# Patient Record
Sex: FEMALE | Race: WHITE | NOT HISPANIC OR LATINO | URBAN - METROPOLITAN AREA
[De-identification: names, ages, dates, MRNs, and addresses within clinical notes are randomized per-mention and may not be internally consistent; named-entity substitution may affect disease eponyms.]

---

## 2018-11-20 ENCOUNTER — EMERGENCY (EMERGENCY)
Facility: HOSPITAL | Age: 48
LOS: 1 days | Discharge: HOME | End: 2018-11-20
Attending: EMERGENCY MEDICINE
Payer: COMMERCIAL

## 2018-11-20 VITALS
SYSTOLIC BLOOD PRESSURE: 118 MMHG | OXYGEN SATURATION: 97 % | RESPIRATION RATE: 17 BRPM | DIASTOLIC BLOOD PRESSURE: 68 MMHG | HEART RATE: 90 BPM

## 2018-11-20 VITALS — WEIGHT: 136.91 LBS | HEIGHT: 61 IN

## 2018-11-20 PROCEDURE — 73030 X-RAY EXAM OF SHOULDER: CPT

## 2018-11-20 PROCEDURE — 73590 X-RAY EXAM OF LOWER LEG: CPT | Mod: 26,RT

## 2018-11-20 PROCEDURE — 99284 EMERGENCY DEPT VISIT MOD MDM: CPT | Mod: 25

## 2018-11-20 PROCEDURE — 73502 X-RAY EXAM HIP UNI 2-3 VIEWS: CPT | Mod: 26,RT

## 2018-11-20 PROCEDURE — 73590 X-RAY EXAM OF LOWER LEG: CPT

## 2018-11-20 PROCEDURE — 73030 X-RAY EXAM OF SHOULDER: CPT | Mod: 26,RT

## 2018-11-20 PROCEDURE — 73502 X-RAY EXAM HIP UNI 2-3 VIEWS: CPT

## 2018-11-20 PROCEDURE — 96372 THER/PROPH/DIAG INJ SC/IM: CPT

## 2018-11-20 PROCEDURE — 99283 EMERGENCY DEPT VISIT LOW MDM: CPT

## 2018-11-20 RX ORDER — KETOROLAC TROMETHAMINE 30 MG/ML
30 SYRINGE (ML) INJECTION ONCE
Qty: 0 | Refills: 0 | Status: DISCONTINUED | OUTPATIENT
Start: 2018-11-20 | End: 2018-11-20

## 2018-11-20 RX ORDER — POLYETHYLENE GLYCOL 3350 17 G/17G
17 POWDER, FOR SOLUTION ORAL
Qty: 85 | Refills: 0 | OUTPATIENT
Start: 2018-11-20 | End: 2018-11-24

## 2018-11-20 RX ADMIN — Medication 30 MILLIGRAM(S): at 20:59

## 2018-11-20 NOTE — ED ADULT TRIAGE NOTE - CHIEF COMPLAINT QUOTE
fell yesterday and rt side of body, knee, arm. hand, elbow, shoulder, neck. gets around with walker.  abd pain with earlier blood in stool also.

## 2018-11-20 NOTE — ED PROVIDER NOTE - OBJECTIVE STATEMENT
49 yo female smoker, PMHx AVN left knee, fibromyalgia, osteoarthritis c/o fall x3 days ago in bathtub. Reports pain in shoulder/hip/leg. Leg pain is worse at 8/10. Reports able to ambulate with limp. Pt pain is managed by Dr. Fan De Santiago. Reports constipation from her medications. PCP: Dr. Aminata Boone.  Denies fever/chills, dizziness, chest pain, SOB, new onset bowel/bladder incontinence.

## 2018-11-20 NOTE — ED PROVIDER NOTE - ATTENDING CONTRIBUTION TO CARE
Pt. with swelling and tenderness to dorsum of right lower leg. Mild ecchymosis noted to lower leg. Sensation to skin intact. I have discussed the plan with the ACP.

## 2018-11-20 NOTE — ED PROVIDER NOTE - NS HIV RISK FACTOR YES
DATE OF ADMISSION:  08/17/2018

 

CHIEF COMPLAINT:  Left ankle injury.

 

HISTORY OF PRESENT ILLNESS:  The patient is a 52-year-old female well known

to me from previous right knee replacement done back in December.  She is

doing relatively well from this.  She was out on her porch last evening when

she fell off the porch, injured her left ankle.  She had acute onset of pain.

 She was seen in the Emergency Room.  X-rays revealed an ankle fracture.  She

was referred to our clinic for treatment.  She describes global ankle pain. 

She can put weight on it.  She has been in the splint since in the ER.

 

Of note, the patient does have a thrombocytopenia with low platelet levels

thought to be related to a hepatic steatosis and splenomegaly.  She was seen

before her knee replacement surgery by the oncologist and felt there was no

intervention warranted.  Otherwise, she has done well.  She did skin up her

knee.  There has been no head injury.

 

PAST MEDICAL HISTORY:

1.  Hypertension.

2.  Sleep apnea with CPAP machine.

3.  Fibromyalgia.

4.  Depression.

5.  Anxiety.

6.  Chronic back pain.

7.  Obesity with a BMI of 44.

8.  Hypothyroidism.

9.  Unspecified neuropathy.

10.  Thrombocytopenia.

 

PAST SURGICAL HISTORY:  Include:

1.  Dorsal spinal column stimulator.

2.  Multiple C-sections.

3.  Right knee arthroscopy.

4.  Right knee replacement done 12/19/2017.

 

ALLERGIES:  PENICILLIN WHICH CAUSE HIVES ONLY.  No respiratory problems. 

ALSO, DESCRIBES ALLERGY TO SULFA AND CIPRO.

 

CURRENT MEDICINES:  Include:

1.  Buspirone 30 mg twice a day.

2.  Trazodone 50 mg at bedtime.

3.  Wellbutrin 150 mg twice.

4.  Lisinopril 20 mg a day.

5.  Synthroid 

6.  Fluticasone nasal spray.

7.  Clonazepam 3 times a day for anxiety.

8.  Advair 2 puffs a day.

9.  Lyrica 200 mg twice a day.

10.  Prozac 20 mg.

11.  Methocarbamol 500 mg 4 times a day.

 

SOCIAL HISTORY:  A 52-year-old female.  She is .  Comes to clinic

with her son who lives in [place].

 

FAMILY HISTORY:  Noncontributory.

 

REVIEW OF SYSTEMS:  Significant for the thrombocytopenia.  Denies any chest

pain, no shortness of breath.  Does not notice any excessive bleeding or any

other issues.  No head injury, no loss of consciousness.

 

PHYSICAL EXAMINATION:

GENERAL:  Reveals a pleasant, middle-aged female.  Looks to be in reasonably

good health.

HEENT:  Benign.

NECK:  Supple, no lymphadenopathy.

LUNGS:  Clear to auscultation.

HEART:  Has a regular rate and rhythm.

ABDOMEN:  Soft, nontender, nondistended.

EXTREMITIES:  Grossly neurovascularly intact except as follows:  Examination

of the left ankle reveals no obvious deformity.  She does have some abrasions

up around her knee and her upper leg area which looked pretty clean.  She is

tender both medially and laterally.  She is neurologically intact.  Some

moderate swelling.

 

X-RAYS:  X-rays of the left ankle reviewed.  She has an unstable Gomez B

ankle fracture with a deltoid ligament injury.  There is some slight

comminution of the fibula.

 

ASSESSMENT:  A 52-year-old female with an unstable Gomez B ankle fracture. 

This is certainly best treated surgically.

 

PLAN:  We talked about treatment options.  She would like to proceed with

surgical treatment.  We will take her to the operating room and do a left

ankle ORIF.  The risks and benefits of this procedure were explained to the

patient including but not limited to DVT,  PE, death, infection, neurological

injury, vascular injury, bleeding problem, pain, limited range of motion,

stiffness, failure to relieve her symptoms, incomplete relief of symptoms,

nonunion, malunion, symptomatic hardware, etc.  The patient understands

and desires to proceed.  Informed consent was obtained.

 

In respect to platelet issue, I do not think it should pose any risk as got

through her knee replacement well.  We will hold off on NSAID products as a

result.  This should be able to be done as an outpatient and we should be

able to send her home as long as everything goes well.  We will keep her

nonweightbearing for the first 2 weeks.

 

 

 

POLA Declined

## 2018-11-20 NOTE — ED ADULT NURSE NOTE - OBJECTIVE STATEMENT
received pt AOx4 in supertrack, fell yesterday and rt side of body, knee, arm. hand, elbow, shoulder, neck. walks with walker,. resp even unlabored, in no distress, MAEx4, neuro intact. will continue to monitor

## 2018-11-20 NOTE — ED PROVIDER NOTE - MEDICAL DECISION MAKING DETAILS
49 yo female presents s/p fall x3 day c/o of pain to shoulder/hip/leg. Also c/o constipation. No acute fx on XR. MiraLAX prescribed. If pain does not subside within 1 week, follow up with PCP for further evaluation. Pt is comfortable with discharge; understands and agrees with plan. 49 yo female presents s/p fall x3 day c/o of pain to shoulder/hip/leg. Also c/o constipation. No acute fx on XR; pt requests crutches to help ambulate. MiraLAX prescribed. If pain does not subside within 1 week, follow up with PCP for further evaluation. Pt is comfortable with discharge; understands and agrees with plan.

## 2018-11-20 NOTE — ED STATDOCS - OBJECTIVE STATEMENT
Telemedicine assessment was conducted (using real time 2 way audio-video technology) by Dr. Tristan Luna located at [address] [place] [address]  ++++++++++++++++++++++++  Pertinent patient history and initial plan: 47 y/o F pt with hx of AVN in L knee, fibromyalgia, osteoarthritis and carpal tunnel presents to ED c/o R UE, R shoulder, R hip, R LE pain s/p fall that occurred yesterday. Pt states she fell in the bathtub yesterday and notes a "dent" to R shin. PT take multiple pain medications from pain management, with no relief. Pt reports constipation, she felt "balls" underneath her skin and states she is unable to void completely. She took OTC stool softener with no relief.   PMD: Dr. Aminata Boone Telemedicine assessment was conducted (using real time 2 way audio-video technology) by Dr. Tristan Luna located at [address] [place] [address]  ++++++++++++++++++++++++  Pertinent patient history and initial plan: 47 y/o F pt with hx of AVN in L knee, fibromyalgia, osteoarthritis and carpal tunnel presents to ED c/o R UE, R shoulder, R hip, R LE pain s/p fall that occurred yesterday. Pt states she fell in the bathtub yesterday and notes a "dent" to R shin. PT take multiple pain medications from pain management, with no relief. Pt reports constipation, she felt "balls" underneath her skin and states she is unable to defecate completely. She took OTC stool softener with no relief.   PMD: Dr. Aminata schmidt xray shoulder, hip and leg  patient ambulatory    discussed otc meds for constipation    Patient seen by me in intake for initial assessment and ordering. Physician on site to follow results and further evaluate and treat patient.

## 2018-11-20 NOTE — ED PROVIDER NOTE - PHYSICAL EXAMINATION
Gen: A&Ox3. WN/WD in NAD, cooperative, well-groomed, appears stated age, seated comfortably on chair.  Head: NC/AT.   Neck: No spinal tenderness.  Cardio: No lifts, heaves, visible pulsations. No thrills. Rate and rhythm regular, S1 & S2 clear. No audible murmur, gallop, or rub.  Pulm: No deformity. Normal AP to lateral diameter. Symmetrical excursion b/l. No chest wall tenderness. Breath sounds vesicular, symmetrical and without rales, rhonchi or wheezing b/l.   MSK: No deformities, wasting, atrophy, crepitus. Small ecchymosis noted to right dorsal forearm. Large hematoma noted to right anterior calf; tender to palpation. Normal color and temperature. Full ROM without tenderness in hands, wrists, elbows, shoulders, spine, hips, knees, ankles.  Strength 5+/5+ b/l upper/lower extremities.   Neuro: Sensation intact. Gen: A&Ox3. WN/WD in NAD, cooperative, well-groomed, appears stated age, seated comfortably on chair.  Head: NC/AT.   Neck: No spinal tenderness.  Cardio: No lifts, heaves, visible pulsations. No thrills. Rate and rhythm regular, S1 & S2 clear. No audible murmur, gallop, or rub.  Pulm: No deformity. Normal AP to lateral diameter. Symmetrical excursion b/l. No chest wall tenderness. Breath sounds vesicular, symmetrical and without rales, rhonchi or wheezing b/l.   MSK: No deformities, wasting, atrophy, crepitus. Small ecchymosis noted to right dorsal forearm. Large hematoma noted to right anterior calf; tender to palpation. Compartments soft b/l. Normal color and temperature. Full ROM without tenderness in hands, wrists, elbows, shoulders, spine, hips, knees, ankles.  Strength 5+/5+ b/l upper/lower extremities.   Neuro: Sensation intact. Gen: A&Ox3. WN/WD in NAD, cooperative, well-groomed, appears stated age, seated comfortably on chair.  Head: NC/AT.   Neck: No spinal tenderness.  Cardio: No lifts, heaves, visible pulsations. No thrills. Rate and rhythm regular, S1 & S2 clear. No audible murmur, gallop, or rub.  Pulm: No deformity. Normal AP to lateral diameter. Symmetrical excursion b/l. No chest wall tenderness. Breath sounds vesicular, symmetrical and without rales, rhonchi or wheezing b/l.   MSK: No deformities, wasting, atrophy, crepitus. Small ecchymosis noted to right dorsal forearm. Minimal swelling, hematoma noted to right anterior calf; tender to palpation. Compartments soft b/l. Normal color and temperature. Full ROM without tenderness in hands, wrists, elbows, shoulders, spine, hips, knees, ankles.  Strength 5+/5+ b/l upper/lower extremities.   Neuro: Sensation intact.

## 2018-11-20 NOTE — ED ADULT NURSE NOTE - NSIMPLEMENTINTERV_GEN_ALL_ED
Implemented All Universal Safety Interventions:  [place] to call system. Call bell, personal items and telephone within reach. Instruct patient to call for assistance. Room bathroom lighting operational. Non-slip footwear when patient is off stretcher. Physically safe environment: no spills, clutter or unnecessary equipment. Stretcher in lowest position, wheels locked, appropriate side rails in place.

## 2019-02-19 ENCOUNTER — INPATIENT (INPATIENT)
Facility: HOSPITAL | Age: 49
LOS: 8 days | Discharge: HOME | DRG: 885 | End: 2019-02-28
Attending: PSYCHIATRY & NEUROLOGY | Admitting: PSYCHIATRY & NEUROLOGY
Payer: COMMERCIAL

## 2019-02-19 DIAGNOSIS — F33.2 MAJOR DEPRESSIVE DISORDER, RECURRENT SEVERE WITHOUT PSYCHOTIC FEATURES: ICD-10-CM

## 2019-02-19 DIAGNOSIS — T43.591A POISONING BY OTHER ANTIPSYCHOTICS AND NEUROLEPTICS, ACCIDENTAL (UNINTENTIONAL), INITIAL ENCOUNTER: ICD-10-CM

## 2019-02-19 PROCEDURE — 90792 PSYCH DIAG EVAL W/MED SRVCS: CPT

## 2019-02-19 PROCEDURE — 12345: CPT | Mod: NC

## 2019-02-19 RX ORDER — AMLODIPINE BESYLATE 2.5 MG/1
2.5 TABLET ORAL DAILY
Qty: 0 | Refills: 0 | Status: DISCONTINUED | OUTPATIENT
Start: 2019-02-19 | End: 2019-02-28

## 2019-02-19 RX ORDER — PANTOPRAZOLE SODIUM 20 MG/1
40 TABLET, DELAYED RELEASE ORAL
Qty: 0 | Refills: 0 | Status: DISCONTINUED | OUTPATIENT
Start: 2019-02-19 | End: 2019-02-28

## 2019-02-19 RX ORDER — NICOTINE POLACRILEX 2 MG
1 GUM BUCCAL DAILY
Qty: 0 | Refills: 0 | Status: DISCONTINUED | OUTPATIENT
Start: 2019-02-19 | End: 2019-02-21

## 2019-02-19 RX ORDER — CELECOXIB 200 MG/1
200 CAPSULE ORAL DAILY
Qty: 0 | Refills: 0 | Status: DISCONTINUED | OUTPATIENT
Start: 2019-02-19 | End: 2019-02-28

## 2019-02-19 RX ORDER — HALOPERIDOL DECANOATE 100 MG/ML
5 INJECTION INTRAMUSCULAR EVERY 6 HOURS
Qty: 0 | Refills: 0 | Status: DISCONTINUED | OUTPATIENT
Start: 2019-02-19 | End: 2019-02-28

## 2019-02-19 RX ORDER — ZOLPIDEM TARTRATE 10 MG/1
5 TABLET ORAL AT BEDTIME
Qty: 0 | Refills: 0 | Status: DISCONTINUED | OUTPATIENT
Start: 2019-02-19 | End: 2019-02-25

## 2019-02-19 RX ORDER — POLYETHYLENE GLYCOL 3350 17 G/17G
17 POWDER, FOR SOLUTION ORAL DAILY
Qty: 0 | Refills: 0 | Status: DISCONTINUED | OUTPATIENT
Start: 2019-02-19 | End: 2019-02-28

## 2019-02-19 RX ORDER — ATORVASTATIN CALCIUM 80 MG/1
20 TABLET, FILM COATED ORAL AT BEDTIME
Qty: 0 | Refills: 0 | Status: DISCONTINUED | OUTPATIENT
Start: 2019-02-19 | End: 2019-02-28

## 2019-02-19 RX ORDER — DIPHENHYDRAMINE HCL 50 MG
50 CAPSULE ORAL EVERY 6 HOURS
Qty: 0 | Refills: 0 | Status: DISCONTINUED | OUTPATIENT
Start: 2019-02-19 | End: 2019-02-28

## 2019-02-19 RX ADMIN — ZOLPIDEM TARTRATE 5 MILLIGRAM(S): 10 TABLET ORAL at 20:38

## 2019-02-19 RX ADMIN — ATORVASTATIN CALCIUM 20 MILLIGRAM(S): 80 TABLET, FILM COATED ORAL at 20:38

## 2019-02-19 RX ADMIN — ZOLPIDEM TARTRATE 5 MILLIGRAM(S): 10 TABLET ORAL at 21:44

## 2019-02-19 NOTE — BEHAVIORAL HEALTH ASSESSMENT NOTE - HPI (INCLUDE ILLNESS QUALITY, SEVERITY, DURATION, TIMING, CONTEXT, MODIFYING FACTORS, ASSOCIATED SIGNS AND SYMPTOMS)
Patient is a 47 y/o MF, with prior psychaitric hospitalizations, with history of depression, admitted to the hospital via transfer from [place]. Patient reportedly presented on 2/15/2019 with s/p overdose with Tizanidine (12 pills x 2mg), Hydroxyzine (15x25). She reportedly left rmessages on families' voicemails stating that she is planning to commit suicide. She was admitted to medicine for elevated for LFT and then was evaluated by psychiatry and determined that she needed a psychiatric admission. Patient presents as evasive and minimizes the event. Of note the anniversary of her son's death via overdose is approaching (March 2018) Patient is a 49 y/o MF, with prior psychaitric hospitalizations, with history of depression, admitted to the hospital via transfer from [place]. Patient reportedly presented on 2/15/2019 with s/p overdose with Tizanidine (12 pills x 2mg), Hydroxyzine (15x25). She reportedly left messages on families' voicemails stating that she is planning to commit suicide. She was admitted to medicine for elevated for LFT and then was evaluated by psychiatry and determined that she needed a psychiatric admission. Patient presents as evasive and minimizes the event. Of note the anniversary of her son's death via overdose is approaching (2018).    Patient is depressed, expressed SI, and continues to lament her son's loss a 27 y/o male who  of heroin overdose. She has multiple medical issues, and endorses pain  which seems withdrawal effect from different pain meds. She never tried to commit suicide, never tried other drugs, smokes cigarettes 2-3 PPD, , socially drinks wine/liquor, and has fair to good sleep from the meds. She denied A/H or other perceptual experiences, denied paranoid beliefs, no manic or hypo-manic episodes noted.    Her issues started since her accident involving her left Knee, which needed arthroscopic surgery, and was given pain meds, and since then she has multiple medical issues, which involves, Osteo-arthritis; Fibromyalgia; Avascular Necrosis of the left Hip; CAD; Migraine etc. and take different pain meds for years.

## 2019-02-19 NOTE — CHART NOTE - NSCHARTNOTEFT_GEN_A_CORE
Called by RN for patient requesting opioid pain meds, seen with the aid Mac MORAES X3, calm, stating that she is on Morphine & Oxycodone, last dose was at 2:30 pm today at [facility], and she is afraid that she may get into withdrawal. O/E: no evidence suggestive of opioid withdrawal.   Received a phone call from her psychiatrist here Dr. Wilson, who recommended putting orders on his behalf for Haldol 5 mg PO Q 6h PRN for agitation, Ativan 2 mg PO Q 6h PRN for agitation, and Benadryl 50 mg PO Q 6h PRN for agitation, no opioids at this time, and he will evaluate in am.   Orders were placed as recommended, explained to patient, she understands & agrees.

## 2019-02-19 NOTE — BEHAVIORAL HEALTH ASSESSMENT NOTE - SUMMARY
47 y/o MF, with no prior psychiatric hospitalizations, disabled, history of depression, admitted for overdose in context of worsened mood likely due to anniversary of her son's death. 49 y/o MF, with no prior psychiatric hospitalizations, disabled, history of depression, admitted for overdose in context of worsened mood likely due to anniversary of her son's death. Continues to lament her son' s loss and need treatment at in-patient level for stability.

## 2019-02-19 NOTE — BEHAVIORAL HEALTH ASSESSMENT NOTE - NSBHADMITIPSTRENGTH_PSY_A_CORE
Has supportive interpersonal relationships with family, friends or peers/Able to manage surrounding demands/opportunities

## 2019-02-20 PROCEDURE — 99223 1ST HOSP IP/OBS HIGH 75: CPT

## 2019-02-20 RX ORDER — MORPHINE SULFATE 50 MG/1
15 CAPSULE, EXTENDED RELEASE ORAL
Qty: 0 | Refills: 0 | Status: DISCONTINUED | OUTPATIENT
Start: 2019-02-20 | End: 2019-02-27

## 2019-02-20 RX ORDER — ZOLPIDEM TARTRATE 10 MG/1
5 TABLET ORAL AT BEDTIME
Qty: 0 | Refills: 0 | Status: DISCONTINUED | OUTPATIENT
Start: 2019-02-20 | End: 2019-02-25

## 2019-02-20 RX ORDER — OXYCODONE HYDROCHLORIDE 5 MG/1
10 TABLET ORAL
Qty: 0 | Refills: 0 | Status: DISCONTINUED | OUTPATIENT
Start: 2019-02-20 | End: 2019-02-22

## 2019-02-20 RX ORDER — NITROGLYCERIN 6.5 MG
1 CAPSULE, EXTENDED RELEASE ORAL DAILY
Qty: 0 | Refills: 0 | Status: DISCONTINUED | OUTPATIENT
Start: 2019-02-20 | End: 2019-02-28

## 2019-02-20 RX ADMIN — ATORVASTATIN CALCIUM 20 MILLIGRAM(S): 80 TABLET, FILM COATED ORAL at 20:38

## 2019-02-20 RX ADMIN — ZOLPIDEM TARTRATE 5 MILLIGRAM(S): 10 TABLET ORAL at 21:47

## 2019-02-20 RX ADMIN — OXYCODONE HYDROCHLORIDE 10 MILLIGRAM(S): 5 TABLET ORAL at 13:00

## 2019-02-20 RX ADMIN — CELECOXIB 200 MILLIGRAM(S): 200 CAPSULE ORAL at 09:08

## 2019-02-20 RX ADMIN — CELECOXIB 200 MILLIGRAM(S): 200 CAPSULE ORAL at 10:00

## 2019-02-20 RX ADMIN — MORPHINE SULFATE 15 MILLIGRAM(S): 50 CAPSULE, EXTENDED RELEASE ORAL at 20:38

## 2019-02-20 RX ADMIN — Medication 1 PATCH: at 09:08

## 2019-02-20 RX ADMIN — AMLODIPINE BESYLATE 2.5 MILLIGRAM(S): 2.5 TABLET ORAL at 09:08

## 2019-02-20 RX ADMIN — ZOLPIDEM TARTRATE 5 MILLIGRAM(S): 10 TABLET ORAL at 20:43

## 2019-02-20 RX ADMIN — Medication 100 MILLIGRAM(S): at 20:38

## 2019-02-20 RX ADMIN — Medication 2 MILLIGRAM(S): at 09:08

## 2019-02-20 RX ADMIN — PANTOPRAZOLE SODIUM 40 MILLIGRAM(S): 20 TABLET, DELAYED RELEASE ORAL at 08:09

## 2019-02-20 RX ADMIN — Medication 1 PATCH: at 22:55

## 2019-02-20 RX ADMIN — OXYCODONE HYDROCHLORIDE 10 MILLIGRAM(S): 5 TABLET ORAL at 18:37

## 2019-02-20 RX ADMIN — OXYCODONE HYDROCHLORIDE 10 MILLIGRAM(S): 5 TABLET ORAL at 19:37

## 2019-02-20 RX ADMIN — OXYCODONE HYDROCHLORIDE 10 MILLIGRAM(S): 5 TABLET ORAL at 12:13

## 2019-02-20 RX ADMIN — MORPHINE SULFATE 15 MILLIGRAM(S): 50 CAPSULE, EXTENDED RELEASE ORAL at 21:46

## 2019-02-20 NOTE — CHART NOTE - NSCHARTNOTEFT_GEN_A_CORE
ISTOP checked Reference #: [id number]     Patient Name: Kia Albrecht YOB: 1970   Address: [address] [place] [address] Sex: Female     02/15/2019 02/16/2019 oxycodone hcl 10 mg tablet  120 30 Mati Marinelli,M     02/15/2019 02/16/2019 morphine sulf er 15 mg tablet  60 30 Mati Marinelli,M     01/15/2019 02/12/2019 lyrica 100 mg capsule  60 30 Mati Marinelli,M     01/15/2019 01/15/2019 oxycodone hcl 10 mg tablet  120 30 Mati Marinelli,M     01/15/2019 01/15/2019 morphine sulf er 15 mg tablet  60 30 Mati Marinelli,M     01/15/2019 01/15/2019 zolpidem tartrate 5 mg tablet  30 30 Mati Marinelli,M     12/17/2018 01/13/2019 lyrica 100 mg capsule  60 30 Mati Marinelli,M     12/19/2018 12/19/2018 alprazolam 0.5 mg tablet  20 10 Dia Montalvo (PA)     12/19/2018 12/19/2018 promethazine-codeine syrup  50ml 10 Dia Montalvo ([place])     09/17/2018 12/17/2018 lyrica 100 mg capsule  60 30 Mati Marinelli,M     12/17/2018 12/17/2018 oxycodone hcl 10 mg tablet  120 30 Mati Marinelli,M     12/17/2018 12/17/2018 morphine sulf er 15 mg tablet  60 30 Mati Marinelli,M     12/17/2018 12/17/2018 zolpidem tartrate 10 mg tablet  30 30 Mati Marinelli,M    11/05/2018 11/16/2018 lyrica 100 mg capsule  60 30 Mati Marinelli,M     11/05/2018 11/16/2018 oxycodone hcl 10 mg tablet  120 30 Mati Marinelli,M     11/05/2018 11/16/2018 zolpidem tartrate 10 mg tablet  30 30 Mati Marinelli,M     11/13/2018 11/13/2018 alprazolam 0.5 mg tablet  20 10 Dia Montalvo ([place])     11/05/2018 11/12/2018 morphine sulf er 15 mg tablet  60 30 Mati Marinelli,M     08/21/2018 09/22/2018 lyrica 100 mg capsule  60 30 Mati Marinelli,M     09/20/2018 09/20/2018 alprazolam 0.5 mg tablet  20 10 Isabel Cook (FNP)     10/10/2018 10/17/2018 lyrica 100 mg capsule  60 30 Mati Marinelli,M     10/10/2018 10/17/2018 zolpidem tartrate 10 mg tablet  30 30 Mati Marinelli,M     10/10/2018 10/17/2018 oxycodone hcl 10 mg tablet  120 30 Mati Marinelli,ANISHA     10/10/2018 10/11/2018 morphine sulf er 15 mg tablet  60 30 Mati Marinelli,M     09/17/2018 09/18/2018 morphine sulf er 15 mg tablet  30 30 Mati Marinelli,M     09/17/2018 09/18/2018 zolpidem tartrate 10 mg tablet  30 30 Mati Marinelli,M     07/24/2018 08/22/2018 lyrica 100 mg capsule  60 30 Mati Marinelli,ANISHA     08/21/2018 08/22/2018 oxycodone hcl 10 mg tablet  120 30 Mati Marinelli,ANISHA     08/21/2018 08/22/2018 morphine sulf er 15 mg tablet  30 30 Mati Marinelli,     08/21/2018 08/22/2018 zolpidem tartrate 10 mg tablet  30 30 Mati Marinelli,M     07/31/2018 07/31/2018 alprazolam 0.5 mg tablet  20 10 Dia Montalvo ([place])     07/31/2018 07/31/2018 promethazine-codeine syrup  50ml 10 Dia Montalvo ([place])     07/24/2018 07/24/2018 oxycodone hcl 10 mg tablet  120 30 Mati Marinelli,M     07/24/2018 07/24/2018 zolpidem tartrate 10 mg tablet  30 30 Elzholz, Mati,M     07/24/2018 07/24/2018 morphine sulf er 15 mg tablet  30 30 Elzholz, Mati,M     06/27/2018 07/23/2018 lyrica 100 mg capsule  60 30 Elzholz, Mati,ANISHA     06/27/2018 06/27/2018 oxycodone hcl 10 mg tablet  120 30 Elzholz, Mati     06/27/2018 06/27/2018 morphine sulf er 15 mg tablet  30 30 Elzholz, Mati     06/27/2018 06/27/2018 zolpidem tartrate 10 mg tablet  30 30 Elzholz, Mati     06/25/2018 06/25/2018 alprazolam 0.5 mg tablet  20 10 Dia Montalvo ([place])     05/23/2018 06/21/2018 lyrica 100 mg capsule  60 30 Elzholz, Mati     05/23/2018 05/27/2018 oxycodone hcl 10 mg tablet  120 30 Elzholz, Mati     05/23/2018 05/24/2018 zolpidem tartrate 10 mg tablet  30 30 Elzholz, Mati     05/23/2018 05/23/2018 morphine sulf er 15 mg tablet  30 30 Elzholz, Mati     04/26/2018 05/22/2018 lyrica 100 mg capsule  60 30 Elzholz, Mati     05/16/2018 05/16/2018 promethazine-codeine syrup  50ml 10 Dia Montalvo ([place])     05/16/2018 05/16/2018 alprazolam 0.5 mg tablet  20 10 Dia Montalvo ([place])     04/25/2018 05/09/2018 morphine sulf er 15 mg tablet  45 12 Elzholz, Mati     04/25/2018 04/30/2018 oxycodone hcl 10 mg tablet  120 30 Elzholz, Mati     04/25/2018 04/25/2018 lyrica 100 mg capsule  60 30 Elzholz, Mati     04/25/2018 04/25/2018 zolpidem tartrate 10 mg tablet  30 30 Elzholz, Mati     04/24/2018 04/24/2018 alprazolam 0.5 mg tablet  20 10 Joyce Isabel DAGMAR (FNP)     04/11/2018 04/11/2018 oxycodone hcl 10 mg tablet  120 20 Elzholz, [place]     04/11/2018 04/11/2018 morphine sulf er 15 mg tablet  60 30 Elzholz, [place]     04/04/2018 04/04/2018 promethazine-codeine syrup  50ml 10 Dia Montalvo ([place])     04/03/2018 04/03/2018 alprazolam 0.5 mg tablet  20 10 Dia Montalvo ([place])     03/17/2018 03/27/2018 lyrica 100 mg capsule  60 30 Elzholz, Mati     03/17/2018 03/19/2018 zolpidem tartrate 10 mg tablet  30 30 Elzholz, [place]     03/08/2018 03/13/2018 oxycodone hcl 10 mg tablet  120 30 Elzholz, Mati     03/08/2018 03/13/2018 morphine sulf er 15 mg tablet  60 30 Elzholz, [place]     03/05/2018 03/05/2018 alprazolam 0.5 mg tablet  20 10 Dia Montalvo (PA) ISTOP checked Reference #: [id number]     Patient Name: Kia Albrecht YOB: 1970   Address: [address] [place] [address] Sex: Female     Dr Mati Marinelli is a Pain management specialist at [facility]  located at  [address] [place] [address] [address], [place] [address]. Writer called his office at  [phone], option #4 and left him a message asking for a call back.     02/15/2019 02/16/2019 oxycodone hcl 10 mg tablet  120 30 Rejidede Mati,M     02/15/2019 02/16/2019 morphine sulf er 15 mg tablet  60 30 Rejidede Mati,M     01/15/2019 02/12/2019 lyrica 100 mg capsule  60 30 Rejidede Mati,M     01/15/2019 01/15/2019 oxycodone hcl 10 mg tablet  120 30 Rejidede Mati,M     01/15/2019 01/15/2019 morphine sulf er 15 mg tablet  60 30 Rejidede Mati,M     01/15/2019 01/15/2019 zolpidem tartrate 5 mg tablet  30 30 RejidedeMati,M     12/17/2018 01/13/2019 lyrica 100 mg capsule  60 30 RejidedeMati,M     12/19/2018 12/19/2018 alprazolam 0.5 mg tablet  20 10 Dia Montalvo ([place])     12/19/2018 12/19/2018 promethazine-codeine syrup  50ml 10 Dia Montalvo ([place])     09/17/2018 12/17/2018 lyrica 100 mg capsule  60 30 RejidedeMati,M     12/17/2018 12/17/2018 oxycodone hcl 10 mg tablet  120 30 Mati Marinelli,M     12/17/2018 12/17/2018 morphine sulf er 15 mg tablet  60 30 Rejidede Mati,M     12/17/2018 12/17/2018 zolpidem tartrate 10 mg tablet  30 30 Mati Marinelli,M    11/05/2018 11/16/2018 lyrica 100 mg capsule  60 30 Mati Marinelli,ANISHA     11/05/2018 11/16/2018 oxycodone hcl 10 mg tablet  120 30 Mati Marinelli,ANISHA     11/05/2018 11/16/2018 zolpidem tartrate 10 mg tablet  30 30 Mati Marinelli,ANISHA     11/13/2018 11/13/2018 alprazolam 0.5 mg tablet  20 10 Dia Montalvo ([place])     11/05/2018 11/12/2018 morphine sulf er 15 mg tablet  60 30 Mati Marinelli,M     08/21/2018 09/22/2018 lyrica 100 mg capsule  60 30 Mati Marinelli,M     09/20/2018 09/20/2018 alprazolam 0.5 mg tablet  20 10 Isabel Cook (FNP)     10/10/2018 10/17/2018 lyrica 100 mg capsule  60 30 Mati Marinelli,ANISHA     10/10/2018 10/17/2018 zolpidem tartrate 10 mg tablet  30 30 Mati Marinelli,ANISHA     10/10/2018 10/17/2018 oxycodone hcl 10 mg tablet  120 30 Mati Marinelli,ANISHA     10/10/2018 10/11/2018 morphine sulf er 15 mg tablet  60 30 Mati Marinelli,M     09/17/2018 09/18/2018 morphine sulf er 15 mg tablet  30 30 Mati Marinelli,ANISHA     09/17/2018 09/18/2018 zolpidem tartrate 10 mg tablet  30 30 Mati Marinelli,M     07/24/2018 08/22/2018 lyrica 100 mg capsule  60 30 Mati Marinelli,ANISHA     08/21/2018 08/22/2018 oxycodone hcl 10 mg tablet  120 30 Mati Marinelli,M     08/21/2018 08/22/2018 morphine sulf er 15 mg tablet  30 30 Mati Marinelli,M     08/21/2018 08/22/2018 zolpidem tartrate 10 mg tablet  30 30 Mati Marinelli,M     07/31/2018 07/31/2018 alprazolam 0.5 mg tablet  20 10 Dia Montalvo (PA)     07/31/2018 07/31/2018 promethazine-codeine syrup  50ml 10 Dia Montalvo (PA)     07/24/2018 07/24/2018 oxycodone hcl 10 mg tablet  120 30 ElzholzMati,ANISHA     07/24/2018 07/24/2018 zolpidem tartrate 10 mg tablet  30 30 ElzholzMati,M     07/24/2018 07/24/2018 morphine sulf er 15 mg tablet  30 30 ElzholzMati,     06/27/2018 07/23/2018 lyrica 100 mg capsule  60 30 ElzholzMati,     06/27/2018 06/27/2018 oxycodone hcl 10 mg tablet  120 30 Elzholz, Mati     06/27/2018 06/27/2018 morphine sulf er 15 mg tablet  30 30 Elzholz, Mati     06/27/2018 06/27/2018 zolpidem tartrate 10 mg tablet  30 30 Elzholz, Mati     06/25/2018 06/25/2018 alprazolam 0.5 mg tablet  20 10 Dia Montalvo (PA)     05/23/2018 06/21/2018 lyrica 100 mg capsule  60 30 Elzholz, Mati     05/23/2018 05/27/2018 oxycodone hcl 10 mg tablet  120 30 Elzholz, Mati     05/23/2018 05/24/2018 zolpidem tartrate 10 mg tablet  30 30 Elzholz, Mati     05/23/2018 05/23/2018 morphine sulf er 15 mg tablet  30 30 Elzholz, Mati     04/26/2018 05/22/2018 lyrica 100 mg capsule  60 30 Elzholz, Mati     05/16/2018 05/16/2018 promethazine-codeine syrup  50ml 10 Dia Montalvo (PA)     05/16/2018 05/16/2018 alprazolam 0.5 mg tablet  20 10 Dia Montalvo (PA)     04/25/2018 05/09/2018 morphine sulf er 15 mg tablet  45 12 Elzholz, Mati     04/25/2018 04/30/2018 oxycodone hcl 10 mg tablet  120 30 Elzholz, [place]     04/25/2018 04/25/2018 lyrica 100 mg capsule  60 30 Elzholz, [place]     04/25/2018 04/25/2018 zolpidem tartrate 10 mg tablet  30 30 Elzholz, [place]     04/24/2018 04/24/2018 alprazolam 0.5 mg tablet  20 10 Isabel Cook (FNP)     04/11/2018 04/11/2018 oxycodone hcl 10 mg tablet  120 20 Elzholz, [place]     04/11/2018 04/11/2018 morphine sulf er 15 mg tablet  60 30 Elzholz, [place]     04/04/2018 04/04/2018 promethazine-codeine syrup  50ml 10 Dia Montalvo ([place])     04/03/2018 04/03/2018 alprazolam 0.5 mg tablet  20 10 Dia Montalvo (PA)     03/17/2018 03/27/2018 lyrica 100 mg capsule  60 30 Elzholz, [place]     03/17/2018 03/19/2018 zolpidem tartrate 10 mg tablet  30 30 Elzholz, [place]     03/08/2018 03/13/2018 oxycodone hcl 10 mg tablet  120 30 Elzholz, [place]     03/08/2018 03/13/2018 morphine sulf er 15 mg tablet  60 30 Elzholz, [place]     03/05/2018 03/05/2018 alprazolam 0.5 mg tablet  20 10 Dia Montalvo (PA)

## 2019-02-20 NOTE — PROGRESS NOTE BEHAVIORAL HEALTH - SUMMARY
47 y/o MF, with no prior psychiatric hospitalizations, disabled, history of depression, admitted for overdose in context of worsened mood likely due to anniversary of her son's death. Continues to lament her son' s loss and need treatment at in-patient level for stability.

## 2019-02-20 NOTE — PROGRESS NOTE BEHAVIORAL HEALTH - NSBHFUPINTERVALHXFT_PSY_A_CORE
Patient is depressed, expressed SI, and continues to lament her son's loss a 29 y/o male who  of heroin overdose. She has multiple medical issues, and endorses pain  which seems withdrawal effect from different pain meds. She never tried to commit suicide, never tried other drugs, smokes cigarettes 2-3 PPD, , socially drinks wine/liquor, and has fair to good sleep from the meds. She denied A/H or other perceptual experiences, denied paranoid beliefs, no manic or hypo-manic episodes noted. Her issues started since her accident involving her left Knee, which needed arthroscopic surgery, and was given pain meds, and since then she has multiple medical issues, which involves, Osteo-arthritis; Fibromyalgia; Avascular Necrosis of the left Hip; CAD; Migraine etc. and take different pain meds for years.

## 2019-02-21 LAB
APPEARANCE UR: CLEAR — SIGNIFICANT CHANGE UP
BILIRUB UR-MCNC: NEGATIVE — SIGNIFICANT CHANGE UP
COLOR SPEC: YELLOW — SIGNIFICANT CHANGE UP
DIFF PNL FLD: NEGATIVE — SIGNIFICANT CHANGE UP
GLUCOSE UR QL: NEGATIVE MG/DL — SIGNIFICANT CHANGE UP
KETONES UR-MCNC: NEGATIVE — SIGNIFICANT CHANGE UP
LEUKOCYTE ESTERASE UR-ACNC: NEGATIVE — SIGNIFICANT CHANGE UP
NITRITE UR-MCNC: NEGATIVE — SIGNIFICANT CHANGE UP
PH UR: 5 — SIGNIFICANT CHANGE UP (ref 5–8)
PROT UR-MCNC: NEGATIVE MG/DL — SIGNIFICANT CHANGE UP
SP GR SPEC: 1.02 — SIGNIFICANT CHANGE UP (ref 1.01–1.02)
UROBILINOGEN FLD QL: NEGATIVE MG/DL — SIGNIFICANT CHANGE UP

## 2019-02-21 PROCEDURE — 99231 SBSQ HOSP IP/OBS SF/LOW 25: CPT

## 2019-02-21 RX ORDER — NICOTINE POLACRILEX 2 MG
1 GUM BUCCAL DAILY
Qty: 0 | Refills: 0 | Status: DISCONTINUED | OUTPATIENT
Start: 2019-02-21 | End: 2019-02-28

## 2019-02-21 RX ORDER — NICOTINE POLACRILEX 2 MG
1 GUM BUCCAL
Qty: 0 | Refills: 0 | Status: DISCONTINUED | OUTPATIENT
Start: 2019-02-21 | End: 2019-02-28

## 2019-02-21 RX ORDER — DULOXETINE HYDROCHLORIDE 30 MG/1
30 CAPSULE, DELAYED RELEASE ORAL DAILY
Qty: 0 | Refills: 0 | Status: DISCONTINUED | OUTPATIENT
Start: 2019-02-22 | End: 2019-02-22

## 2019-02-21 RX ADMIN — ZOLPIDEM TARTRATE 5 MILLIGRAM(S): 10 TABLET ORAL at 22:22

## 2019-02-21 RX ADMIN — ZOLPIDEM TARTRATE 5 MILLIGRAM(S): 10 TABLET ORAL at 21:17

## 2019-02-21 RX ADMIN — Medication 1 PATCH: at 11:07

## 2019-02-21 RX ADMIN — Medication 2 MILLIGRAM(S): at 17:08

## 2019-02-21 RX ADMIN — Medication 1 PATCH: at 16:04

## 2019-02-21 RX ADMIN — MORPHINE SULFATE 15 MILLIGRAM(S): 50 CAPSULE, EXTENDED RELEASE ORAL at 11:08

## 2019-02-21 RX ADMIN — Medication 1 PATCH: at 11:06

## 2019-02-21 RX ADMIN — Medication 1 PATCH: at 19:42

## 2019-02-21 RX ADMIN — MORPHINE SULFATE 15 MILLIGRAM(S): 50 CAPSULE, EXTENDED RELEASE ORAL at 20:47

## 2019-02-21 RX ADMIN — OXYCODONE HYDROCHLORIDE 10 MILLIGRAM(S): 5 TABLET ORAL at 14:50

## 2019-02-21 RX ADMIN — Medication 2 MILLIGRAM(S): at 00:43

## 2019-02-21 RX ADMIN — ATORVASTATIN CALCIUM 20 MILLIGRAM(S): 80 TABLET, FILM COATED ORAL at 20:47

## 2019-02-21 RX ADMIN — Medication 1 EACH: at 18:41

## 2019-02-21 RX ADMIN — AMLODIPINE BESYLATE 2.5 MILLIGRAM(S): 2.5 TABLET ORAL at 11:09

## 2019-02-21 RX ADMIN — Medication 50 MILLIGRAM(S): at 00:43

## 2019-02-21 RX ADMIN — CELECOXIB 200 MILLIGRAM(S): 200 CAPSULE ORAL at 11:09

## 2019-02-21 RX ADMIN — Medication 1 PATCH: at 11:11

## 2019-02-21 RX ADMIN — MORPHINE SULFATE 15 MILLIGRAM(S): 50 CAPSULE, EXTENDED RELEASE ORAL at 12:00

## 2019-02-21 RX ADMIN — CELECOXIB 200 MILLIGRAM(S): 200 CAPSULE ORAL at 12:00

## 2019-02-21 RX ADMIN — Medication 100 MILLIGRAM(S): at 11:08

## 2019-02-21 RX ADMIN — Medication 1 PATCH: at 16:03

## 2019-02-21 RX ADMIN — Medication 100 MILLIGRAM(S): at 20:47

## 2019-02-21 RX ADMIN — OXYCODONE HYDROCHLORIDE 10 MILLIGRAM(S): 5 TABLET ORAL at 15:32

## 2019-02-21 NOTE — PROGRESS NOTE BEHAVIORAL HEALTH - NSBHFUPINTERVALHXFT_PSY_A_CORE
Patient is depressed, expressed SI, and continues to lament her son's loss a 29 y/o male who  of heroin overdose. She has multiple medical issues, and endorses pain  which seems withdrawal effect from different pain meds. She never tried to commit suicide, never tried other drugs, smokes cigarettes 2-3 PPD,  socially drinks wine/liquor, and has fair to good sleep from the meds. She denied A/H or other perceptual experiences, denied paranoid beliefs, no manic or hypo-manic episodes noted. She continues to minimise that why she is off the pian meds, her sister tried to explain that we not depriving her of meds but to decrease her meds requirement so she would be observed how she tolerates the meds well with decreasing Body Morphine. Cymbalta 30 mg added with Nicotine Patch increased to 21 mg starting tomorrow.

## 2019-02-22 LAB
ANION GAP SERPL CALC-SCNC: 12 MMOL/L — SIGNIFICANT CHANGE UP (ref 5–17)
BUN SERPL-MCNC: 18 MG/DL — SIGNIFICANT CHANGE UP (ref 7–23)
CALCIUM SERPL-MCNC: 9.1 MG/DL — SIGNIFICANT CHANGE UP (ref 8.4–10.5)
CHLORIDE SERPL-SCNC: 104 MMOL/L — SIGNIFICANT CHANGE UP (ref 96–108)
CHOLEST SERPL-MCNC: 182 MG/DL — SIGNIFICANT CHANGE UP (ref 10–199)
CO2 SERPL-SCNC: 23 MMOL/L — SIGNIFICANT CHANGE UP (ref 22–31)
CREAT SERPL-MCNC: 0.96 MG/DL — SIGNIFICANT CHANGE UP (ref 0.5–1.3)
GLUCOSE SERPL-MCNC: 100 MG/DL — HIGH (ref 70–99)
HBA1C BLD-MCNC: 5.8 % — HIGH (ref 4–5.6)
HCT VFR BLD CALC: 37.1 % — SIGNIFICANT CHANGE UP (ref 34.5–45)
HDLC SERPL-MCNC: 43 MG/DL — LOW
HGB BLD-MCNC: 12.2 G/DL — SIGNIFICANT CHANGE UP (ref 11.5–15.5)
LIPID PNL WITH DIRECT LDL SERPL: 118 MG/DL — HIGH
MCHC RBC-ENTMCNC: 30.2 PG — SIGNIFICANT CHANGE UP (ref 27–34)
MCHC RBC-ENTMCNC: 32.9 GM/DL — SIGNIFICANT CHANGE UP (ref 32–36)
MCV RBC AUTO: 91.8 FL — SIGNIFICANT CHANGE UP (ref 80–100)
NRBC # BLD: 0 /100 WBCS — SIGNIFICANT CHANGE UP (ref 0–0)
PLATELET # BLD AUTO: 205 K/UL — SIGNIFICANT CHANGE UP (ref 150–400)
POTASSIUM SERPL-MCNC: 3.1 MMOL/L — LOW (ref 3.5–5.3)
POTASSIUM SERPL-SCNC: 3.1 MMOL/L — LOW (ref 3.5–5.3)
RBC # BLD: 4.04 M/UL — SIGNIFICANT CHANGE UP (ref 3.8–5.2)
RBC # FLD: 12.3 % — SIGNIFICANT CHANGE UP (ref 10.3–14.5)
SODIUM SERPL-SCNC: 139 MMOL/L — SIGNIFICANT CHANGE UP (ref 135–145)
T PALLIDUM AB TITR SER: NEGATIVE — SIGNIFICANT CHANGE UP
TOTAL CHOLESTEROL/HDL RATIO MEASUREMENT: 4.2 RATIO — SIGNIFICANT CHANGE UP (ref 3.3–7.1)
TRIGL SERPL-MCNC: 103 MG/DL — SIGNIFICANT CHANGE UP (ref 10–149)
TSH SERPL-MCNC: 2.7 UIU/ML — SIGNIFICANT CHANGE UP (ref 0.27–4.2)
WBC # BLD: 6.79 K/UL — SIGNIFICANT CHANGE UP (ref 3.8–10.5)
WBC # FLD AUTO: 6.79 K/UL — SIGNIFICANT CHANGE UP (ref 3.8–10.5)

## 2019-02-22 PROCEDURE — 99231 SBSQ HOSP IP/OBS SF/LOW 25: CPT

## 2019-02-22 RX ORDER — OXYCODONE HYDROCHLORIDE 5 MG/1
10 TABLET ORAL EVERY 8 HOURS
Qty: 0 | Refills: 0 | Status: DISCONTINUED | OUTPATIENT
Start: 2019-02-22 | End: 2019-02-28

## 2019-02-22 RX ORDER — DULOXETINE HYDROCHLORIDE 30 MG/1
40 CAPSULE, DELAYED RELEASE ORAL DAILY
Qty: 0 | Refills: 0 | Status: DISCONTINUED | OUTPATIENT
Start: 2019-02-23 | End: 2019-02-28

## 2019-02-22 RX ADMIN — Medication 2 MILLIGRAM(S): at 20:17

## 2019-02-22 RX ADMIN — CELECOXIB 200 MILLIGRAM(S): 200 CAPSULE ORAL at 09:13

## 2019-02-22 RX ADMIN — Medication 1 PATCH: at 09:11

## 2019-02-22 RX ADMIN — Medication 1 PATCH: at 20:20

## 2019-02-22 RX ADMIN — OXYCODONE HYDROCHLORIDE 10 MILLIGRAM(S): 5 TABLET ORAL at 02:42

## 2019-02-22 RX ADMIN — PANTOPRAZOLE SODIUM 40 MILLIGRAM(S): 20 TABLET, DELAYED RELEASE ORAL at 09:13

## 2019-02-22 RX ADMIN — AMLODIPINE BESYLATE 2.5 MILLIGRAM(S): 2.5 TABLET ORAL at 09:14

## 2019-02-22 RX ADMIN — Medication 1 PATCH: at 09:12

## 2019-02-22 RX ADMIN — DULOXETINE HYDROCHLORIDE 30 MILLIGRAM(S): 30 CAPSULE, DELAYED RELEASE ORAL at 09:14

## 2019-02-22 RX ADMIN — OXYCODONE HYDROCHLORIDE 10 MILLIGRAM(S): 5 TABLET ORAL at 03:12

## 2019-02-22 RX ADMIN — CELECOXIB 200 MILLIGRAM(S): 200 CAPSULE ORAL at 09:55

## 2019-02-22 RX ADMIN — ATORVASTATIN CALCIUM 20 MILLIGRAM(S): 80 TABLET, FILM COATED ORAL at 20:17

## 2019-02-22 RX ADMIN — Medication 1 PATCH: at 08:14

## 2019-02-22 RX ADMIN — ZOLPIDEM TARTRATE 5 MILLIGRAM(S): 10 TABLET ORAL at 20:18

## 2019-02-22 RX ADMIN — Medication 100 MILLIGRAM(S): at 09:23

## 2019-02-22 RX ADMIN — MORPHINE SULFATE 15 MILLIGRAM(S): 50 CAPSULE, EXTENDED RELEASE ORAL at 10:00

## 2019-02-22 RX ADMIN — Medication 1 PATCH: at 09:05

## 2019-02-22 RX ADMIN — MORPHINE SULFATE 15 MILLIGRAM(S): 50 CAPSULE, EXTENDED RELEASE ORAL at 20:20

## 2019-02-22 RX ADMIN — Medication 1 PATCH: at 16:00

## 2019-02-22 RX ADMIN — ZOLPIDEM TARTRATE 5 MILLIGRAM(S): 10 TABLET ORAL at 21:55

## 2019-02-22 RX ADMIN — Medication 1 PATCH: at 09:15

## 2019-02-22 RX ADMIN — MORPHINE SULFATE 15 MILLIGRAM(S): 50 CAPSULE, EXTENDED RELEASE ORAL at 20:17

## 2019-02-22 RX ADMIN — Medication 1 EACH: at 16:49

## 2019-02-22 RX ADMIN — MORPHINE SULFATE 15 MILLIGRAM(S): 50 CAPSULE, EXTENDED RELEASE ORAL at 09:23

## 2019-02-22 RX ADMIN — OXYCODONE HYDROCHLORIDE 10 MILLIGRAM(S): 5 TABLET ORAL at 17:00

## 2019-02-22 RX ADMIN — OXYCODONE HYDROCHLORIDE 10 MILLIGRAM(S): 5 TABLET ORAL at 16:37

## 2019-02-22 RX ADMIN — Medication 100 MILLIGRAM(S): at 20:18

## 2019-02-22 RX ADMIN — Medication 1 EACH: at 09:24

## 2019-02-22 NOTE — PROGRESS NOTE BEHAVIORAL HEALTH - SUMMARY
49 y/o MF, with no prior psychiatric hospitalizations, disabled, history of depression, admitted for overdose in context of worsened mood likely due to anniversary of her son's death. Continues to lament her son' s loss and need treatment at in-patient level for stability.

## 2019-02-22 NOTE — PROGRESS NOTE BEHAVIORAL HEALTH - NSBHFUPINTERVALHXFT_PSY_A_CORE
Patient was seen today AM, prefers to stay by herself, some participation in unit activities, overall less drowsy and less annoyed, but keeps quiet and with limited participation in unit activities. Cymbalta increased to 40 mg daily from tomorrow. PRN Oxycodone was also increased to Q-8 PRN pian. To continue current meds management.

## 2019-02-23 DIAGNOSIS — F11.20 OPIOID DEPENDENCE, UNCOMPLICATED: ICD-10-CM

## 2019-02-23 DIAGNOSIS — Z63.4 DISAPPEARANCE AND DEATH OF FAMILY MEMBER: ICD-10-CM

## 2019-02-23 DIAGNOSIS — F17.210 NICOTINE DEPENDENCE, CIGARETTES, UNCOMPLICATED: ICD-10-CM

## 2019-02-23 RX ORDER — POTASSIUM CHLORIDE 20 MEQ
10 PACKET (EA) ORAL ONCE
Qty: 0 | Refills: 0 | Status: COMPLETED | OUTPATIENT
Start: 2019-02-23 | End: 2019-02-23

## 2019-02-23 RX ORDER — POTASSIUM CHLORIDE 20 MEQ
20 PACKET (EA) ORAL ONCE
Qty: 0 | Refills: 0 | Status: COMPLETED | OUTPATIENT
Start: 2019-02-23 | End: 2019-02-23

## 2019-02-23 RX ADMIN — Medication 2 MILLIGRAM(S): at 20:35

## 2019-02-23 RX ADMIN — AMLODIPINE BESYLATE 2.5 MILLIGRAM(S): 2.5 TABLET ORAL at 08:58

## 2019-02-23 RX ADMIN — ZOLPIDEM TARTRATE 5 MILLIGRAM(S): 10 TABLET ORAL at 20:35

## 2019-02-23 RX ADMIN — OXYCODONE HYDROCHLORIDE 10 MILLIGRAM(S): 5 TABLET ORAL at 14:17

## 2019-02-23 RX ADMIN — Medication 100 MILLIGRAM(S): at 08:57

## 2019-02-23 RX ADMIN — MORPHINE SULFATE 15 MILLIGRAM(S): 50 CAPSULE, EXTENDED RELEASE ORAL at 08:59

## 2019-02-23 RX ADMIN — OXYCODONE HYDROCHLORIDE 10 MILLIGRAM(S): 5 TABLET ORAL at 15:05

## 2019-02-23 RX ADMIN — PANTOPRAZOLE SODIUM 40 MILLIGRAM(S): 20 TABLET, DELAYED RELEASE ORAL at 07:58

## 2019-02-23 RX ADMIN — Medication 1 PATCH: at 21:00

## 2019-02-23 RX ADMIN — DULOXETINE HYDROCHLORIDE 40 MILLIGRAM(S): 30 CAPSULE, DELAYED RELEASE ORAL at 08:57

## 2019-02-23 RX ADMIN — Medication 1 PATCH: at 08:58

## 2019-02-23 RX ADMIN — Medication 100 MILLIGRAM(S): at 20:35

## 2019-02-23 RX ADMIN — Medication 20 MILLIEQUIVALENT(S): at 08:58

## 2019-02-23 RX ADMIN — ZOLPIDEM TARTRATE 5 MILLIGRAM(S): 10 TABLET ORAL at 22:00

## 2019-02-23 RX ADMIN — Medication 10 MILLIEQUIVALENT(S): at 17:24

## 2019-02-23 RX ADMIN — Medication 1 PATCH: at 09:02

## 2019-02-23 RX ADMIN — MORPHINE SULFATE 15 MILLIGRAM(S): 50 CAPSULE, EXTENDED RELEASE ORAL at 20:35

## 2019-02-23 RX ADMIN — OXYCODONE HYDROCHLORIDE 10 MILLIGRAM(S): 5 TABLET ORAL at 23:51

## 2019-02-23 RX ADMIN — ATORVASTATIN CALCIUM 20 MILLIGRAM(S): 80 TABLET, FILM COATED ORAL at 20:35

## 2019-02-23 RX ADMIN — Medication 1 PATCH: at 09:01

## 2019-02-23 RX ADMIN — CELECOXIB 200 MILLIGRAM(S): 200 CAPSULE ORAL at 08:57

## 2019-02-23 RX ADMIN — MORPHINE SULFATE 15 MILLIGRAM(S): 50 CAPSULE, EXTENDED RELEASE ORAL at 09:41

## 2019-02-23 RX ADMIN — MORPHINE SULFATE 15 MILLIGRAM(S): 50 CAPSULE, EXTENDED RELEASE ORAL at 21:00

## 2019-02-23 RX ADMIN — Medication 1 EACH: at 08:59

## 2019-02-23 RX ADMIN — Medication 1 EACH: at 17:24

## 2019-02-23 RX ADMIN — CELECOXIB 200 MILLIGRAM(S): 200 CAPSULE ORAL at 09:50

## 2019-02-23 SDOH — SOCIAL STABILITY - SOCIAL INSECURITY: DISSAPEARANCE AND DEATH OF FAMILY MEMBER: Z63.4

## 2019-02-23 NOTE — PROGRESS NOTE BEHAVIORAL HEALTH - NSBHFUPINTERVALHXFT_PSY_A_CORE
Patient was seen today AM, prefers to stay by herself, despite napping a lot denies nighttime insomnia. Feels like she should not be here and looking forward to being d/c-d. Denies that she OD'd says she ran out of scripts and accidentally took the new refill of opiates along with Ambien just wanted to sleep. Denies any SI/HI. Mood is "eh". Future oriented continues to want to get into a grief counseling group upon discharge to process son's death to heroine OD and its upcoming first anniversary. med compliant no behavioral issues. Denies opiate or nicotine withdrawal sxs. Cough continues but manageable. Patient was seen today AM, prefers to stay in the room as opposed to coming to the office, remains isolative and napping most days. denies nighttime insomnia. Feels like she should not be here and looking forward to being d/c-d. Denies that she OD'd says she ran out of scripts and accidentally took the new refill of opiates along with Ambien just wanted to sleep. Denies any SI/HI. Mood is "eh". Future oriented continues to want to get into a grief counseling group upon discharge to process son's death to heroine OD and its upcoming first anniversary. med compliant no behavioral issues. Denies opiate or nicotine withdrawal sxs. Cough continues but manageable.

## 2019-02-23 NOTE — PROGRESS NOTE BEHAVIORAL HEALTH - SUMMARY
47 y/o MF, with no prior psychiatric hospitalizations, disabled, history of depression, admitted for overdose in context of worsened mood likely due to anniversary of her son's death. Continues to lament her son' s loss and need treatment at in-patient level for stability. Stable on current meds, interested in grief group counseling upon discharge. Denies any SI HI poorly engaged with unit activities but cooperative with writer. Denies acute issues. 47 y/o MF, with no prior psychiatric hospitalizations, disabled, history of depression, admitted for overdose in context of worsened mood likely due to anniversary of her son's death. Continues to lament her son' s loss and need treatment at in-patient level for stability and observation. Stable on current meds, interested in grief group counseling upon discharge. Denies any SI HI poorly engaged with unit activities but cooperative with writer. Denies acute issues.

## 2019-02-24 LAB
ANION GAP SERPL CALC-SCNC: 8 MMOL/L — SIGNIFICANT CHANGE UP (ref 5–17)
BUN SERPL-MCNC: 19 MG/DL — SIGNIFICANT CHANGE UP (ref 7–23)
CALCIUM SERPL-MCNC: 8.6 MG/DL — SIGNIFICANT CHANGE UP (ref 8.4–10.5)
CHLORIDE SERPL-SCNC: 106 MMOL/L — SIGNIFICANT CHANGE UP (ref 96–108)
CO2 SERPL-SCNC: 26 MMOL/L — SIGNIFICANT CHANGE UP (ref 22–31)
CREAT SERPL-MCNC: 0.79 MG/DL — SIGNIFICANT CHANGE UP (ref 0.5–1.3)
GLUCOSE SERPL-MCNC: 170 MG/DL — HIGH (ref 70–99)
POTASSIUM SERPL-MCNC: 3.9 MMOL/L — SIGNIFICANT CHANGE UP (ref 3.5–5.3)
POTASSIUM SERPL-SCNC: 3.9 MMOL/L — SIGNIFICANT CHANGE UP (ref 3.5–5.3)
SODIUM SERPL-SCNC: 140 MMOL/L — SIGNIFICANT CHANGE UP (ref 135–145)

## 2019-02-24 RX ORDER — ACYCLOVIR 50 MG/G
1 OINTMENT TOPICAL DAILY
Qty: 0 | Refills: 0 | Status: DISCONTINUED | OUTPATIENT
Start: 2019-02-24 | End: 2019-02-28

## 2019-02-24 RX ORDER — FLUTICASONE PROPIONATE 50 MCG
1 SPRAY, SUSPENSION NASAL
Qty: 0 | Refills: 0 | Status: DISCONTINUED | OUTPATIENT
Start: 2019-02-24 | End: 2019-02-28

## 2019-02-24 RX ADMIN — Medication 1 PATCH: at 20:18

## 2019-02-24 RX ADMIN — MORPHINE SULFATE 15 MILLIGRAM(S): 50 CAPSULE, EXTENDED RELEASE ORAL at 08:24

## 2019-02-24 RX ADMIN — Medication 1 EACH: at 08:24

## 2019-02-24 RX ADMIN — MORPHINE SULFATE 15 MILLIGRAM(S): 50 CAPSULE, EXTENDED RELEASE ORAL at 21:15

## 2019-02-24 RX ADMIN — Medication 1 SPRAY(S): at 20:17

## 2019-02-24 RX ADMIN — OXYCODONE HYDROCHLORIDE 10 MILLIGRAM(S): 5 TABLET ORAL at 21:36

## 2019-02-24 RX ADMIN — AMLODIPINE BESYLATE 2.5 MILLIGRAM(S): 2.5 TABLET ORAL at 08:25

## 2019-02-24 RX ADMIN — ACYCLOVIR 1 APPLICATION(S): 50 OINTMENT TOPICAL at 20:24

## 2019-02-24 RX ADMIN — Medication 100 MILLIGRAM(S): at 20:17

## 2019-02-24 RX ADMIN — DULOXETINE HYDROCHLORIDE 40 MILLIGRAM(S): 30 CAPSULE, DELAYED RELEASE ORAL at 08:24

## 2019-02-24 RX ADMIN — OXYCODONE HYDROCHLORIDE 10 MILLIGRAM(S): 5 TABLET ORAL at 22:35

## 2019-02-24 RX ADMIN — PANTOPRAZOLE SODIUM 40 MILLIGRAM(S): 20 TABLET, DELAYED RELEASE ORAL at 07:34

## 2019-02-24 RX ADMIN — MORPHINE SULFATE 15 MILLIGRAM(S): 50 CAPSULE, EXTENDED RELEASE ORAL at 09:15

## 2019-02-24 RX ADMIN — Medication 1 PATCH: at 08:22

## 2019-02-24 RX ADMIN — CELECOXIB 200 MILLIGRAM(S): 200 CAPSULE ORAL at 08:24

## 2019-02-24 RX ADMIN — MORPHINE SULFATE 15 MILLIGRAM(S): 50 CAPSULE, EXTENDED RELEASE ORAL at 20:17

## 2019-02-24 RX ADMIN — CELECOXIB 200 MILLIGRAM(S): 200 CAPSULE ORAL at 10:43

## 2019-02-24 RX ADMIN — Medication 2 MILLIGRAM(S): at 20:17

## 2019-02-24 RX ADMIN — Medication 1 EACH: at 14:59

## 2019-02-24 RX ADMIN — Medication 1 PATCH: at 08:26

## 2019-02-24 RX ADMIN — OXYCODONE HYDROCHLORIDE 10 MILLIGRAM(S): 5 TABLET ORAL at 11:30

## 2019-02-24 RX ADMIN — ZOLPIDEM TARTRATE 5 MILLIGRAM(S): 10 TABLET ORAL at 21:34

## 2019-02-24 RX ADMIN — OXYCODONE HYDROCHLORIDE 10 MILLIGRAM(S): 5 TABLET ORAL at 10:40

## 2019-02-24 RX ADMIN — Medication 1 PATCH: at 20:19

## 2019-02-24 RX ADMIN — Medication 100 MILLIGRAM(S): at 08:24

## 2019-02-24 RX ADMIN — ZOLPIDEM TARTRATE 5 MILLIGRAM(S): 10 TABLET ORAL at 20:17

## 2019-02-24 RX ADMIN — ATORVASTATIN CALCIUM 20 MILLIGRAM(S): 80 TABLET, FILM COATED ORAL at 20:17

## 2019-02-24 RX ADMIN — Medication 1 PATCH: at 08:23

## 2019-02-25 PROCEDURE — 99231 SBSQ HOSP IP/OBS SF/LOW 25: CPT

## 2019-02-25 RX ORDER — ZOLPIDEM TARTRATE 10 MG/1
5 TABLET ORAL AT BEDTIME
Qty: 0 | Refills: 0 | Status: DISCONTINUED | OUTPATIENT
Start: 2019-02-25 | End: 2019-02-28

## 2019-02-25 RX ORDER — HYDROXYZINE HCL 10 MG
25 TABLET ORAL AT BEDTIME
Qty: 0 | Refills: 0 | Status: DISCONTINUED | OUTPATIENT
Start: 2019-02-25 | End: 2019-02-28

## 2019-02-25 RX ORDER — ZOLPIDEM TARTRATE 10 MG/1
5 TABLET ORAL AT BEDTIME
Qty: 0 | Refills: 0 | Status: DISCONTINUED | OUTPATIENT
Start: 2019-02-25 | End: 2019-02-25

## 2019-02-25 RX ADMIN — AMLODIPINE BESYLATE 2.5 MILLIGRAM(S): 2.5 TABLET ORAL at 09:06

## 2019-02-25 RX ADMIN — Medication 2 MILLIGRAM(S): at 18:37

## 2019-02-25 RX ADMIN — Medication 2 MILLIGRAM(S): at 08:46

## 2019-02-25 RX ADMIN — Medication 1 PATCH: at 20:22

## 2019-02-25 RX ADMIN — Medication 1 SPRAY(S): at 09:06

## 2019-02-25 RX ADMIN — OXYCODONE HYDROCHLORIDE 10 MILLIGRAM(S): 5 TABLET ORAL at 13:00

## 2019-02-25 RX ADMIN — CELECOXIB 200 MILLIGRAM(S): 200 CAPSULE ORAL at 09:06

## 2019-02-25 RX ADMIN — ACYCLOVIR 1 APPLICATION(S): 50 OINTMENT TOPICAL at 09:06

## 2019-02-25 RX ADMIN — DULOXETINE HYDROCHLORIDE 40 MILLIGRAM(S): 30 CAPSULE, DELAYED RELEASE ORAL at 08:38

## 2019-02-25 RX ADMIN — MORPHINE SULFATE 15 MILLIGRAM(S): 50 CAPSULE, EXTENDED RELEASE ORAL at 09:30

## 2019-02-25 RX ADMIN — OXYCODONE HYDROCHLORIDE 10 MILLIGRAM(S): 5 TABLET ORAL at 21:40

## 2019-02-25 RX ADMIN — Medication 1 PATCH: at 09:00

## 2019-02-25 RX ADMIN — OXYCODONE HYDROCHLORIDE 10 MILLIGRAM(S): 5 TABLET ORAL at 22:40

## 2019-02-25 RX ADMIN — OXYCODONE HYDROCHLORIDE 10 MILLIGRAM(S): 5 TABLET ORAL at 00:50

## 2019-02-25 RX ADMIN — ATORVASTATIN CALCIUM 20 MILLIGRAM(S): 80 TABLET, FILM COATED ORAL at 20:20

## 2019-02-25 RX ADMIN — Medication 1 PATCH: at 08:40

## 2019-02-25 RX ADMIN — Medication 1 PATCH: at 08:39

## 2019-02-25 RX ADMIN — Medication 1 PATCH: at 13:12

## 2019-02-25 RX ADMIN — Medication 2 MILLIGRAM(S): at 02:18

## 2019-02-25 RX ADMIN — MORPHINE SULFATE 15 MILLIGRAM(S): 50 CAPSULE, EXTENDED RELEASE ORAL at 21:15

## 2019-02-25 RX ADMIN — ZOLPIDEM TARTRATE 5 MILLIGRAM(S): 10 TABLET ORAL at 21:39

## 2019-02-25 RX ADMIN — Medication 1 SPRAY(S): at 20:21

## 2019-02-25 RX ADMIN — MORPHINE SULFATE 15 MILLIGRAM(S): 50 CAPSULE, EXTENDED RELEASE ORAL at 08:39

## 2019-02-25 RX ADMIN — PANTOPRAZOLE SODIUM 40 MILLIGRAM(S): 20 TABLET, DELAYED RELEASE ORAL at 08:38

## 2019-02-25 RX ADMIN — Medication 100 MILLIGRAM(S): at 08:39

## 2019-02-25 RX ADMIN — MORPHINE SULFATE 15 MILLIGRAM(S): 50 CAPSULE, EXTENDED RELEASE ORAL at 20:20

## 2019-02-25 RX ADMIN — ZOLPIDEM TARTRATE 5 MILLIGRAM(S): 10 TABLET ORAL at 20:20

## 2019-02-25 RX ADMIN — Medication 100 MILLIGRAM(S): at 20:20

## 2019-02-25 RX ADMIN — CELECOXIB 200 MILLIGRAM(S): 200 CAPSULE ORAL at 13:13

## 2019-02-25 NOTE — PROGRESS NOTE BEHAVIORAL HEALTH - SUMMARY
49 y/o MF, with no prior psychiatric hospitalizations, disabled, history of depression, admitted for overdose in context of worsened mood likely due to anniversary of her son's death. Continues to lament her son' s loss and need treatment at in-patient level for stability and observation. Stable on current meds, interested in grief group counseling upon discharge. Denies any SI HI poorly engaged with unit activities but cooperative with writer. Denies acute issues.

## 2019-02-25 NOTE — PROGRESS NOTE BEHAVIORAL HEALTH - NSBHFUPINTERVALHXFT_PSY_A_CORE
Patient was seen today AM, prefers to stay in the room as opposed to coming to the office, remains isolative and napping most days. She mentions that she has trouble sleep, and was advised to have no more sleep aids as she is on too much sleep/pain meds. Affect looks brighter, but continues to have issues with pain meds as she continues to take pain meds round the clock. She is sedated so she has some trouble sleeping, even with Ambien. To have family Meeting for discharge Planning soon. Cymbalta increased to 40 mg/day.

## 2019-02-26 PROCEDURE — 99231 SBSQ HOSP IP/OBS SF/LOW 25: CPT

## 2019-02-26 RX ORDER — MIRTAZAPINE 45 MG/1
15 TABLET, ORALLY DISINTEGRATING ORAL AT BEDTIME
Qty: 0 | Refills: 0 | Status: DISCONTINUED | OUTPATIENT
Start: 2019-02-26 | End: 2019-02-28

## 2019-02-26 RX ADMIN — MIRTAZAPINE 15 MILLIGRAM(S): 45 TABLET, ORALLY DISINTEGRATING ORAL at 22:32

## 2019-02-26 RX ADMIN — AMLODIPINE BESYLATE 2.5 MILLIGRAM(S): 2.5 TABLET ORAL at 13:08

## 2019-02-26 RX ADMIN — ATORVASTATIN CALCIUM 20 MILLIGRAM(S): 80 TABLET, FILM COATED ORAL at 20:35

## 2019-02-26 RX ADMIN — MORPHINE SULFATE 15 MILLIGRAM(S): 50 CAPSULE, EXTENDED RELEASE ORAL at 23:08

## 2019-02-26 RX ADMIN — Medication 2 MILLIGRAM(S): at 00:38

## 2019-02-26 RX ADMIN — Medication 100 MILLIGRAM(S): at 13:18

## 2019-02-26 RX ADMIN — Medication 1 PATCH: at 13:19

## 2019-02-26 RX ADMIN — CELECOXIB 200 MILLIGRAM(S): 200 CAPSULE ORAL at 14:00

## 2019-02-26 RX ADMIN — Medication 1 PATCH: at 22:35

## 2019-02-26 RX ADMIN — MORPHINE SULFATE 15 MILLIGRAM(S): 50 CAPSULE, EXTENDED RELEASE ORAL at 13:17

## 2019-02-26 RX ADMIN — Medication 1 SPRAY(S): at 22:36

## 2019-02-26 RX ADMIN — Medication 2 MILLIGRAM(S): at 14:07

## 2019-02-26 RX ADMIN — Medication 1 PATCH: at 22:39

## 2019-02-26 RX ADMIN — Medication 1 PATCH: at 13:09

## 2019-02-26 RX ADMIN — MORPHINE SULFATE 15 MILLIGRAM(S): 50 CAPSULE, EXTENDED RELEASE ORAL at 22:36

## 2019-02-26 RX ADMIN — Medication 1 PATCH: at 13:08

## 2019-02-26 RX ADMIN — CELECOXIB 200 MILLIGRAM(S): 200 CAPSULE ORAL at 13:08

## 2019-02-26 RX ADMIN — DULOXETINE HYDROCHLORIDE 40 MILLIGRAM(S): 30 CAPSULE, DELAYED RELEASE ORAL at 13:08

## 2019-02-26 RX ADMIN — OXYCODONE HYDROCHLORIDE 10 MILLIGRAM(S): 5 TABLET ORAL at 15:15

## 2019-02-26 RX ADMIN — ZOLPIDEM TARTRATE 5 MILLIGRAM(S): 10 TABLET ORAL at 20:34

## 2019-02-26 RX ADMIN — ACYCLOVIR 1 APPLICATION(S): 50 OINTMENT TOPICAL at 13:09

## 2019-02-26 RX ADMIN — Medication 1 SPRAY(S): at 13:07

## 2019-02-26 RX ADMIN — MORPHINE SULFATE 15 MILLIGRAM(S): 50 CAPSULE, EXTENDED RELEASE ORAL at 14:00

## 2019-02-26 RX ADMIN — Medication 100 MILLIGRAM(S): at 20:34

## 2019-02-26 RX ADMIN — Medication 1 EACH: at 13:10

## 2019-02-26 RX ADMIN — Medication 1 PATCH: at 13:18

## 2019-02-26 RX ADMIN — Medication 1 EACH: at 19:06

## 2019-02-26 RX ADMIN — PANTOPRAZOLE SODIUM 40 MILLIGRAM(S): 20 TABLET, DELAYED RELEASE ORAL at 07:40

## 2019-02-26 RX ADMIN — OXYCODONE HYDROCHLORIDE 10 MILLIGRAM(S): 5 TABLET ORAL at 14:32

## 2019-02-26 NOTE — PROGRESS NOTE BEHAVIORAL HEALTH - SUMMARY
47 y/o MF, with no prior psychiatric hospitalizations, disabled, history of depression, admitted for overdose in context of worsened mood likely due to anniversary of her son's death. Continues to lament her son' s loss and need treatment at in-patient level for stability and observation. Stable on current meds, interested in grief group counseling upon discharge. Denies any SI HI poorly engaged with unit activities but cooperative with writer. Denies acute issues.

## 2019-02-26 NOTE — PROGRESS NOTE BEHAVIORAL HEALTH - NSBHFUPINTERVALHXFT_PSY_A_CORE
Patient was seen today AM, prefers to stay in the room as opposed to coming to the office, remains isolative and napping most days. She mentions that she has trouble sleep, and was advised to have no more sleep aids as she is on too much sleep/pain meds. Patient is fixated to leave, prefers to have 1:1 for therapy,  was informed that he needs to find himself on his own to have 1:1 therapy as suggested by her insurance. Overall OK, but fixated to leave ASAP and celebrate her son's death. No meds changes are made, to continue current meds for stability.

## 2019-02-27 PROCEDURE — 99231 SBSQ HOSP IP/OBS SF/LOW 25: CPT

## 2019-02-27 RX ORDER — DULOXETINE HYDROCHLORIDE 30 MG/1
1 CAPSULE, DELAYED RELEASE ORAL
Qty: 30 | Refills: 0 | OUTPATIENT
Start: 2019-02-27 | End: 2019-03-28

## 2019-02-27 RX ORDER — CELECOXIB 200 MG/1
1 CAPSULE ORAL
Qty: 0 | Refills: 0 | COMMUNITY
Start: 2019-02-27

## 2019-02-27 RX ORDER — ZOLPIDEM TARTRATE 10 MG/1
1 TABLET ORAL
Qty: 0 | Refills: 0 | COMMUNITY
Start: 2019-02-27

## 2019-02-27 RX ORDER — OXYCODONE HYDROCHLORIDE 5 MG/1
1 TABLET ORAL
Qty: 0 | Refills: 0 | COMMUNITY
Start: 2019-02-27

## 2019-02-27 RX ORDER — MORPHINE SULFATE 50 MG/1
15 CAPSULE, EXTENDED RELEASE ORAL
Qty: 0 | Refills: 0 | Status: DISCONTINUED | OUTPATIENT
Start: 2019-02-28 | End: 2019-02-28

## 2019-02-27 RX ORDER — AMLODIPINE BESYLATE 2.5 MG/1
1 TABLET ORAL
Qty: 0 | Refills: 0 | COMMUNITY
Start: 2019-02-27

## 2019-02-27 RX ORDER — FLUTICASONE PROPIONATE 50 MCG
1 SPRAY, SUSPENSION NASAL
Qty: 0 | Refills: 0 | COMMUNITY
Start: 2019-02-27

## 2019-02-27 RX ORDER — NITROGLYCERIN 6.5 MG
1 CAPSULE, EXTENDED RELEASE ORAL
Qty: 0 | Refills: 0 | COMMUNITY
Start: 2019-02-27

## 2019-02-27 RX ORDER — ATORVASTATIN CALCIUM 80 MG/1
1 TABLET, FILM COATED ORAL
Qty: 0 | Refills: 0 | COMMUNITY
Start: 2019-02-27

## 2019-02-27 RX ORDER — MORPHINE SULFATE 50 MG/1
1 CAPSULE, EXTENDED RELEASE ORAL
Qty: 0 | Refills: 0 | COMMUNITY
Start: 2019-02-27

## 2019-02-27 RX ORDER — PANTOPRAZOLE SODIUM 20 MG/1
1 TABLET, DELAYED RELEASE ORAL
Qty: 30 | Refills: 0 | OUTPATIENT
Start: 2019-02-27 | End: 2019-03-28

## 2019-02-27 RX ORDER — POLYETHYLENE GLYCOL 3350 17 G/17G
17 POWDER, FOR SOLUTION ORAL
Qty: 0 | Refills: 0 | COMMUNITY
Start: 2019-02-27

## 2019-02-27 RX ORDER — MIRTAZAPINE 45 MG/1
1 TABLET, ORALLY DISINTEGRATING ORAL
Qty: 30 | Refills: 0 | OUTPATIENT
Start: 2019-02-27 | End: 2019-03-28

## 2019-02-27 RX ADMIN — Medication 1 PATCH: at 08:49

## 2019-02-27 RX ADMIN — Medication 1 PATCH: at 08:39

## 2019-02-27 RX ADMIN — Medication 1 SPRAY(S): at 08:41

## 2019-02-27 RX ADMIN — Medication 100 MILLIGRAM(S): at 20:24

## 2019-02-27 RX ADMIN — MORPHINE SULFATE 15 MILLIGRAM(S): 50 CAPSULE, EXTENDED RELEASE ORAL at 09:15

## 2019-02-27 RX ADMIN — PANTOPRAZOLE SODIUM 40 MILLIGRAM(S): 20 TABLET, DELAYED RELEASE ORAL at 07:23

## 2019-02-27 RX ADMIN — OXYCODONE HYDROCHLORIDE 10 MILLIGRAM(S): 5 TABLET ORAL at 07:11

## 2019-02-27 RX ADMIN — Medication 1 EACH: at 12:00

## 2019-02-27 RX ADMIN — Medication 2 MILLIGRAM(S): at 19:03

## 2019-02-27 RX ADMIN — CELECOXIB 200 MILLIGRAM(S): 200 CAPSULE ORAL at 08:37

## 2019-02-27 RX ADMIN — AMLODIPINE BESYLATE 2.5 MILLIGRAM(S): 2.5 TABLET ORAL at 08:37

## 2019-02-27 RX ADMIN — Medication 1 SPRAY(S): at 20:23

## 2019-02-27 RX ADMIN — Medication 100 MILLIGRAM(S): at 08:45

## 2019-02-27 RX ADMIN — MORPHINE SULFATE 15 MILLIGRAM(S): 50 CAPSULE, EXTENDED RELEASE ORAL at 20:23

## 2019-02-27 RX ADMIN — ACYCLOVIR 1 APPLICATION(S): 50 OINTMENT TOPICAL at 08:42

## 2019-02-27 RX ADMIN — ATORVASTATIN CALCIUM 20 MILLIGRAM(S): 80 TABLET, FILM COATED ORAL at 20:23

## 2019-02-27 RX ADMIN — Medication 1 PATCH: at 08:48

## 2019-02-27 RX ADMIN — OXYCODONE HYDROCHLORIDE 10 MILLIGRAM(S): 5 TABLET ORAL at 16:10

## 2019-02-27 RX ADMIN — Medication 2 MILLIGRAM(S): at 12:14

## 2019-02-27 RX ADMIN — Medication 1 PATCH: at 21:28

## 2019-02-27 RX ADMIN — DULOXETINE HYDROCHLORIDE 40 MILLIGRAM(S): 30 CAPSULE, DELAYED RELEASE ORAL at 08:37

## 2019-02-27 RX ADMIN — OXYCODONE HYDROCHLORIDE 10 MILLIGRAM(S): 5 TABLET ORAL at 08:00

## 2019-02-27 RX ADMIN — Medication 1 EACH: at 07:37

## 2019-02-27 RX ADMIN — MORPHINE SULFATE 15 MILLIGRAM(S): 50 CAPSULE, EXTENDED RELEASE ORAL at 08:38

## 2019-02-27 RX ADMIN — MORPHINE SULFATE 15 MILLIGRAM(S): 50 CAPSULE, EXTENDED RELEASE ORAL at 21:27

## 2019-02-27 RX ADMIN — CELECOXIB 200 MILLIGRAM(S): 200 CAPSULE ORAL at 09:15

## 2019-02-27 RX ADMIN — OXYCODONE HYDROCHLORIDE 10 MILLIGRAM(S): 5 TABLET ORAL at 15:24

## 2019-02-27 RX ADMIN — MIRTAZAPINE 15 MILLIGRAM(S): 45 TABLET, ORALLY DISINTEGRATING ORAL at 20:24

## 2019-02-27 NOTE — PROGRESS NOTE BEHAVIORAL HEALTH - NSBHFUPINTERVALHXFT_PSY_A_CORE
Patient was seen today AM, takes her meds as prescribed. No acute issues, denies S/H/I/P. To be discharge d tomorrow so she would be able to participate on the first death anniversary of her son. Discussed with her sister who was informed that she would be discharged tomorrow and to F/U as their plans. Patient to have lot of support from her family on her son's anniversary day . Team aware of her discharge. Meds were sent to Pharmacy.

## 2019-02-28 ENCOUNTER — TRANSCRIPTION ENCOUNTER (OUTPATIENT)
Age: 49
End: 2019-02-28

## 2019-02-28 PROCEDURE — 83036 HEMOGLOBIN GLYCOSYLATED A1C: CPT

## 2019-02-28 PROCEDURE — 99239 HOSP IP/OBS DSCHRG MGMT >30: CPT

## 2019-02-28 PROCEDURE — 94640 AIRWAY INHALATION TREATMENT: CPT

## 2019-02-28 PROCEDURE — 81003 URINALYSIS AUTO W/O SCOPE: CPT

## 2019-02-28 PROCEDURE — 84443 ASSAY THYROID STIM HORMONE: CPT

## 2019-02-28 PROCEDURE — 80061 LIPID PANEL: CPT

## 2019-02-28 PROCEDURE — 36415 COLL VENOUS BLD VENIPUNCTURE: CPT

## 2019-02-28 PROCEDURE — 80048 BASIC METABOLIC PNL TOTAL CA: CPT

## 2019-02-28 PROCEDURE — 85027 COMPLETE CBC AUTOMATED: CPT

## 2019-02-28 PROCEDURE — 86780 TREPONEMA PALLIDUM: CPT

## 2019-02-28 RX ADMIN — Medication 1 PATCH: at 08:51

## 2019-02-28 RX ADMIN — OXYCODONE HYDROCHLORIDE 10 MILLIGRAM(S): 5 TABLET ORAL at 03:41

## 2019-02-28 RX ADMIN — CELECOXIB 200 MILLIGRAM(S): 200 CAPSULE ORAL at 08:43

## 2019-02-28 RX ADMIN — CELECOXIB 200 MILLIGRAM(S): 200 CAPSULE ORAL at 11:50

## 2019-02-28 RX ADMIN — Medication 1 SPRAY(S): at 08:42

## 2019-02-28 RX ADMIN — DULOXETINE HYDROCHLORIDE 40 MILLIGRAM(S): 30 CAPSULE, DELAYED RELEASE ORAL at 08:43

## 2019-02-28 RX ADMIN — PANTOPRAZOLE SODIUM 40 MILLIGRAM(S): 20 TABLET, DELAYED RELEASE ORAL at 08:42

## 2019-02-28 RX ADMIN — MORPHINE SULFATE 15 MILLIGRAM(S): 50 CAPSULE, EXTENDED RELEASE ORAL at 11:49

## 2019-02-28 RX ADMIN — Medication 1 PATCH: at 08:47

## 2019-02-28 RX ADMIN — Medication 1 PATCH: at 03:43

## 2019-02-28 RX ADMIN — Medication 100 MILLIGRAM(S): at 09:56

## 2019-02-28 RX ADMIN — MORPHINE SULFATE 15 MILLIGRAM(S): 50 CAPSULE, EXTENDED RELEASE ORAL at 08:46

## 2019-02-28 NOTE — DISCHARGE NOTE ADULT - SOCIAL WORKER'S NAME
[facility] at [facility]: Edwardo Swenson LMSW, @ [phone]

## 2019-02-28 NOTE — PROGRESS NOTE BEHAVIORAL HEALTH - SECONDARY DX2
Cigarette nicotine dependence without complication

## 2019-02-28 NOTE — PROGRESS NOTE BEHAVIORAL HEALTH - RISK ASSESSMENT
Currently denies SI/HI, future oriented but still struggling with son's OD
Currently denies SI/HI, future oriented but still struggling with son's OD
At this time patient is at high risk for suicide and requires inpatient hospitalization
Currently denies SI/HI, future oriented but still struggling with son's OD

## 2019-02-28 NOTE — PROGRESS NOTE BEHAVIORAL HEALTH - NSBHADMITIPOBSFT_PSY_A_CORE
routine Checks

## 2019-02-28 NOTE — DISCHARGE NOTE ADULT - HOSPITAL COURSE
Patient is a 49 y/o MF, with no prior psychiatric hospitalizations, with history of depression, admitted to the hospital via transfer from [place]. Patient reportedly presented on 2/15/2019 with s/p overdose with Tizanidine (12 pills x 2mg), Hydroxyzine (15x25). She reportedly left messages on families' voicemail stating that she is planning to commit suicide. She was admitted to medicine for elevated for LFT and then was evaluated by psychiatry and determined that she needed a psychiatric admission. Patient presents as evasive and minimizes the event. Of note the anniversary of her son's death via overdose is approaching (2018).    Patient was admitted involuntarily form [facility]. Patient is depressed, expressed SI, and continues to lament her son's loss a 29 y/o male who  of heroin overdose. She has multiple medical issues, and endorses pain  which seems withdrawal effect from different pain meds. She never tried to commit suicide, never tried other drugs, smokes cigarettes 2-3 PPD, socially drinks wine/liquor, and has fair to good sleep from the meds. She denied A/H or other perceptual experiences, denied paranoid beliefs, no manic or hypo-manic episodes noted. She had meds seeking behavior since she was admitted, had a blank stare initially, she preferred to stay in bed most of the time and later as time passed she started to open up and after few days she was started on Cymbalta 20 mg with titration to Cymbalta to 40 mg/day. She was on Cymbalta and complained of poor sleep and continues to remain isolative. She was given Remeron 7.5 mg HS with titration to Remeron 15 mg HS. She further improved on her mood and started to socialize with staffs/peers. She did mention that she was not suicidal and did mention repeatedly she was not able to sleep and was the reason why she took the pills. Her family was concerned fo her son's first death anniversary, but made enough arrangements for her not to be alone on that day as her family from her 's and her family would be there to johana the first death anniversary without any incident. She was given 30 days supply of meds of Remeron/Cymbalta before discharge. He has her own supply of pain meds as given by her Pain Physician. Family meeting was held with her /Son and her sister and they expressed satisfaction over her progress.    Medically has Fibromyalgia; Pain Syndrome and Osteonecrosis etc. controlled with pain meds as previously given. No pain meds were changed during her stay here.

## 2019-02-28 NOTE — DISCHARGE NOTE ADULT - REASON FOR ADMISSION
49 y/o MF, with no prior psychiatric hospitalizations, disabled, history of depression, admitted for overdose in context of worsened mood likely due to anniversary of her son's death. Continues to lament her son' s loss and need treatment at in-patient level for stability and observation.

## 2019-02-28 NOTE — DISCHARGE NOTE ADULT - CARE PLAN
Principal Discharge DX:	Severe episode of recurrent major depressive disorder, without psychotic features  Goal:	Stability/Remission  Assessment and plan of treatment:	Patient is stable on her above condition before discharge. She was compliant with her meds, overall doing well with limited withdrawal symptoms. Good sleep/appetite, not S/H at the time of discharge.  Secondary Diagnosis:	Uncomplicated opioid dependence

## 2019-02-28 NOTE — DISCHARGE NOTE ADULT - PLAN OF CARE
Stability/Remission Patient is stable on her above condition before discharge. She was compliant with her meds, overall doing well with limited withdrawal symptoms. Good sleep/appetite, not S/H at the time of discharge.

## 2019-02-28 NOTE — PROGRESS NOTE BEHAVIORAL HEALTH - NSBHFUPINTERVALCCFT_PSY_A_CORE
" I think I'm ready to leave "
"it's hard to feel good when you don't belong here"
' Depressed and suicidal "
' I want to continue my pain meds "
' I want to go home "
" I think I'm ready to leave "
" I'm happy that I am leaving "
" I'm happy to leave tomorrow "

## 2019-02-28 NOTE — DISCHARGE NOTE ADULT - PATIENT PORTAL LINK FT
You can access the [facility], offered by Health system, by registering with the following website: [email or web address]

## 2019-02-28 NOTE — DISCHARGE NOTE ADULT - MEDICATION SUMMARY - MEDICATIONS TO TAKE
I will START or STAY ON the medications listed below when I get home from the hospital:    morphine 15 mg/8 to 12 hr oral tablet, extended release  -- 1 tab(s) by mouth 2 times a day  -- Indication: For Pain    oxyCODONE 10 mg oral tablet  -- 1 tab(s) by mouth every 8 hours, As needed, Severe Pain (7 - 10)  -- Indication: For Pain PRN    celecoxib 200 mg oral capsule  -- 1 cap(s) by mouth once a day  -- Indication: For Pain    nitroglycerin 0.1 mg/hr transdermal film, extended release  -- 1 film(s) by transdermal patch once a day  -- Indication: For Chest Pain    pregabalin 100 mg oral capsule  -- 1 cap(s) by mouth 2 times a day  -- Indication: For Fibromyalgia    mirtazapine 15 mg oral tablet  -- 1 tab(s) by mouth once a day (at bedtime), As needed, insomnia  -- Indication: For Mood    DULoxetine 40 mg oral delayed release capsule  -- 1 cap(s) by mouth once a day-Mood  -- Indication: For Mood    atorvastatin 20 mg oral tablet  -- 1 tab(s) by mouth once a day (at bedtime)  -- Indication: For HLD    zolpidem 5 mg oral tablet  -- 1 tab(s) by mouth once a day (at bedtime), As needed, Insomnia  -- Indication: For Sleep    zolpidem 5 mg oral tablet  -- 1 tab(s) by mouth once a day (at bedtime), As needed, Insomnia  -- Indication: For Sleep    amLODIPine 2.5 mg oral tablet  -- 1 tab(s) by mouth once a day  -- Indication: For HTN    polyethylene glycol 3350 oral powder for reconstitution  -- 17 gram(s) by mouth once a day, As needed, Constipation  -- Indication: For Constipation    fluticasone 50 mcg/inh nasal spray  -- 1 spray(s) into nose 2 times a day  -- Indication: For Nasal Congestion    pantoprazole 40 mg oral delayed release tablet  -- 1 tab(s) by mouth once a day (before a meal)  -- Indication: For GERD

## 2019-02-28 NOTE — DISCHARGE NOTE ADULT - MEDICATION SUMMARY - MEDICATIONS TO STOP TAKING
I will STOP taking the medications listed below when I get home from the hospital:    Mobic 7.5 mg oral tablet  -- 1 tab(s) by mouth once a day    Ambien CR 6.25 mg oral tablet, extended release  -- 1 tab(s) by mouth once a day (at bedtime)    Elavil  --   , As Needed

## 2019-02-28 NOTE — PROGRESS NOTE BEHAVIORAL HEALTH - OTHER
Superficially cooperative

## 2019-02-28 NOTE — PROGRESS NOTE BEHAVIORAL HEALTH - SECONDARY DX1
Uncomplicated opioid dependence

## 2019-02-28 NOTE — PROGRESS NOTE BEHAVIORAL HEALTH - NSBHPTASSESSDT_PSY_A_CORE
20-Feb-2019 16:17
21-Feb-2019 15:16
22-Feb-2019 15:32
23-Feb-2019 11:44
26-Feb-2019 15:36
25-Feb-2019 14:48
28-Feb-2019 11:40
27-Feb-2019 14:14

## 2019-02-28 NOTE — DISCHARGE NOTE ADULT - CARE PROVIDER_API CALL
normal Ligia Abdalla  [address] [facility]; [address]  [place]; [place]; [facility]  03/13/2019 @ 3:15 PM  Phone: [phone]  Fax: (   )    -  Follow Up Time: 2 weeks

## 2019-02-28 NOTE — PROGRESS NOTE BEHAVIORAL HEALTH - NSBHADMITMEDEDUDETAILS_A_CORE FT
Discussed risks/benefits/s-e

## 2019-02-28 NOTE — PROGRESS NOTE BEHAVIORAL HEALTH - PRIMARY DX
Severe episode of recurrent major depressive disorder, without psychotic features

## 2019-02-28 NOTE — PROGRESS NOTE BEHAVIORAL HEALTH - NS ED BHA MED ROS GENITOURINARY
No complaints
Deviated nasal septum    Dyslipidemia    Hypertension    Prolactinoma    Type 2 diabetes mellitus
No complaints

## 2019-02-28 NOTE — PROGRESS NOTE BEHAVIORAL HEALTH - NSBHFUPINTERVALHXFT_PSY_A_CORE
Patient was seen today AM, takes her meds as prescribed. No acute issues, denies S/H/I/P, greeted writer with a smile  and as per nursing/family, patient is awake, alert and more out-going and also more forthcoming of her issues. Her family is making extensive arrangements so she does not stay alone over the weekend and her son's first death anniversary is well organized so everyone participates and patient to stay healthy and safe. team agreed with the plan today AM. she received 30 days supply of anti-depressant meds before discharge.

## 2019-02-28 NOTE — DISCHARGE NOTE ADULT - PROVIDER TOKENS
FREE:[LAST:[Lianne],FIRST:[Ligia],PHONE:[phone]],FAX:[phone]   [phone]    -],ADDRESS:[[address] [place] [address] Road; [address]  [place]; [place]; [facility]  03/13/2019 @ 3:15 PM],FOLLOWUP:[2 weeks]]

## 2019-02-28 NOTE — DISCHARGE NOTE ADULT - ADDITIONAL INSTRUCTIONS
pt has a follow up appointment with her primary pain management doctor Dr. Mati Marinelli at [address] [facility] [address] [place], [place] on 3/14 at 3:15pm  Pt also has an appointment with a therapist Ligia Renee, [phone] on 3/13 at 3:15pm located at [address] [facility] [address] [place] [address] near [facility]. pt has a follow up appointment with her primary pain management doctor Dr. Mati Marinelli at [address] [facility] [address] [place], [place] on 3/14 at 3:15pm  Pt also has an appointment with a therapist Ligia Renee, [phone] on 3/13 at 3:15pm located at [address] [facility] [address] [place], [place] near [facility].

## 2019-08-05 NOTE — ED ADULT NURSE NOTE - NS TRANSFER PATIENT BELONGINGS
The patient was evaluated and managed by the physician assistant / nurse practitioner.     Karlo Llanes MD  08/04/19 3502    
Clothing